# Patient Record
Sex: FEMALE | Race: BLACK OR AFRICAN AMERICAN
[De-identification: names, ages, dates, MRNs, and addresses within clinical notes are randomized per-mention and may not be internally consistent; named-entity substitution may affect disease eponyms.]

---

## 2018-12-28 LAB
HCT VFR BLD CALC: 37.9 % (ref 36–45)
INR BLD: 1.02
LYMPHOCYTES # SPEC AUTO: 1.8 K/UL (ref 0.7–4.9)
PMV BLD: 8.6 FL (ref 7.6–11.3)
RBC # BLD: 4.14 M/UL (ref 3.86–4.86)
UA COMPLETE W REFLEX CULTURE PNL UR: (no result)
UA DIPSTICK W REFLEX MICRO PNL UR: (no result)

## 2019-01-03 ENCOUNTER — HOSPITAL ENCOUNTER (OUTPATIENT)
Dept: HOSPITAL 97 - OR | Age: 49
Discharge: HOME | End: 2019-01-03
Attending: OBSTETRICS & GYNECOLOGY
Payer: COMMERCIAL

## 2019-01-03 DIAGNOSIS — N73.6: ICD-10-CM

## 2019-01-03 DIAGNOSIS — N81.2: Primary | ICD-10-CM

## 2019-01-03 DIAGNOSIS — E03.9: ICD-10-CM

## 2019-01-03 DIAGNOSIS — N92.1: ICD-10-CM

## 2019-01-03 DIAGNOSIS — R87.810: ICD-10-CM

## 2019-01-03 DIAGNOSIS — N70.11: ICD-10-CM

## 2019-01-03 DIAGNOSIS — Z80.0: ICD-10-CM

## 2019-01-03 PROCEDURE — 86850 RBC ANTIBODY SCREEN: CPT

## 2019-01-03 PROCEDURE — 36415 COLL VENOUS BLD VENIPUNCTURE: CPT

## 2019-01-03 PROCEDURE — 81025 URINE PREGNANCY TEST: CPT

## 2019-01-03 PROCEDURE — 81003 URINALYSIS AUTO W/O SCOPE: CPT

## 2019-01-03 PROCEDURE — 81015 MICROSCOPIC EXAM OF URINE: CPT

## 2019-01-03 PROCEDURE — 0USG7ZZ REPOSITION VAGINA, VIA NATURAL OR ARTIFICIAL OPENING: ICD-10-PCS

## 2019-01-03 PROCEDURE — 85730 THROMBOPLASTIN TIME PARTIAL: CPT

## 2019-01-03 PROCEDURE — 85610 PROTHROMBIN TIME: CPT

## 2019-01-03 PROCEDURE — 86901 BLOOD TYPING SEROLOGIC RH(D): CPT

## 2019-01-03 PROCEDURE — 0UT94ZZ RESECTION OF UTERUS, PERCUTANEOUS ENDOSCOPIC APPROACH: ICD-10-PCS

## 2019-01-03 PROCEDURE — 88305 TISSUE EXAM BY PATHOLOGIST: CPT

## 2019-01-03 PROCEDURE — 86900 BLOOD TYPING SEROLOGIC ABO: CPT

## 2019-01-03 PROCEDURE — 0UT54ZZ RESECTION OF RIGHT FALLOPIAN TUBE, PERCUTANEOUS ENDOSCOPIC APPROACH: ICD-10-PCS

## 2019-01-03 PROCEDURE — 85025 COMPLETE CBC W/AUTO DIFF WBC: CPT

## 2019-01-03 RX ADMIN — SODIUM CHLORIDE ONE MLS: 0.9 INJECTION, SOLUTION INTRAVENOUS at 08:16

## 2019-01-03 RX ADMIN — CEFAZOLIN SODIUM ONE MLS: 2 SOLUTION INTRAVENOUS at 08:18

## 2019-01-03 RX ADMIN — CEFAZOLIN SODIUM ONE MLS: 2 SOLUTION INTRAVENOUS at 08:10

## 2019-01-03 RX ADMIN — SODIUM CHLORIDE ONE MLS: 0.9 INJECTION, SOLUTION INTRAVENOUS at 08:30

## 2019-01-07 NOTE — OP
Date of Procedure:  01/03/2019



Surgeon:  Funmi Brooks MD



Assistant:  Emi Cee.



Preoperative Diagnoses:  Uterine prolapse, incomplete uterovaginal prolapse, excessive menstrual blee
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



Postoperative Diagnoses:  Uterine prolapse, incomplete uterovaginal prolapse, excessive menstrual ble
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



Procedures Performed:  

1.Total laparoscopic hysterectomy, bilateral salpingectomy.

2.Uterosacral ligament suspension, colpopexy, and cystoscopy.



Anesthesia:  General.



Specimens:  Uterus, bilateral tubes.



Complications:  No complications.



Drains:  No drains.



Condition:  Stable.



Findings:  Appendix completely normal.  No evidence of any significant endometriosis.  There were adh
esions of the ovary to the left lateral wall, especially on the left.  There were dense thick adhesio
ns.  Bilateral tubes had signs of hydrosalpinges.  Liver and gallbladder are unremarkable. 

Ovaries were unremarkable, so they were left in place.  Uterus and tubes were removed.  The vaginal c
uff was suspended to the uterosacral ligaments with the help of PDS sutures and the vaginal cuff clos
ure was done in 2 layers.  One layer with 0 Vicryl and the second layer with the help of 2-0 V-Loc.



Indications:  The patient is a 48-year-old with heavy bleeding, increasing over time and increasing v
aginal bulge symptoms.  She also had history of HPV 18, however, her Pap smears have been negative.  
A transvaginal ultrasound was performed, sampling was performed.  No adnexal masses were noted and no
 endometrial atypia or malignancy were noted.  Discussed all the options, especially given the fact t
hat she has uterine prolapse.  This is the most significant defect.  Her POP-Q was minus 2, minus 2, 
0, 4, moderate.  Total vaginal length 7-8 cm, minus 2, minus 2, and minus 1.  She needed prolapse rep
air as her main symptom.  She did not want to use pessary and wanted surgical intervention. 



She had known stress urinary incontinence.  No occult stress urinary incontinence.  Negative cough st
ress test at both places and no symptoms of SHARON or incomplete emptying. 



No further urodynamic testing was performed.  She was brought to the OR after being consented for hys
terectomy, bilateral salpingectomy, uterosacral ligament suspension, colpopexy and if needed, perineo
rrhaphy or anterior wall repair.



Description Of Procedure:  After informed consent was verified, the patient was taken back to the OR,
 placed in a supine fashion on the operating table.  After general anesthesia was given, she was plac
ed in a dorsal lithotomy position using Demetrio stirrups.  Arms were tucked by the side.  Catheter was 
placed in the bladder and attached to cysto tubing to an LR bag, emptied 300 and a large VCare was in
troduced into the uterus.  The cervix appeared to be cystic and significantly enlarged.  The large VC
are cup was a bit too small for this.  However, since that is the largest cup on the VCare system bruno
t is what I used and this was fixed in place.  This area was draped. 



A 1 cm infraumbilical incision was made with a scalpel using the open laparoscopy.  A supraumbilical 
incision was made with a scalpel.  Using the open laparoscopy technique, the fascia was incised.  A s
mall area of the umbilical hernia was noted.  There was preperitoneal fat that was protruding through
 this.  This was removed.  The fascia was completely exposed well for later suturing.  Tags with 0 Vi
cryl sutures were placed on each side.  Peritoneum entered bluntly, S retractors placed.  Josue intr
oduced.  Insufflation of the abdomen optimally done.  The patient's upper abdominal survey negative. 
 Liver, gallbladder, upper abdominal surfaces, and omentum were normal.  The patient was placed in T 
bag.  A 5 mm left lower quadrant and 10 mm suprapubic incisions were made.  There was no right lower 
quadrant incision.  All the trocars were placed under direct vision.  Visualized the courses of the u
reters.  The tubes and ovaries as dictated above were unremarkable other than the mild hydrosalpinges
.  There was adhesion of the left ovary to the left lateral wall as well as the right to the posterio
r aspect of the broad ligament.  These adhesions were taken down with sharp dissection and with the L
igaSure 5 mm curved tip.  Then, all the anatomical restorations were done.  Then, the VCare cup was d
ifficult to visualize.  This was anticipated given the fact that the cervical size was larger than th
e VCare cup. 



Then, the broad ligament was opened up below the round ligament, bladder flap raised all the way to t
he opposite round ligament.  Bladder dissected inferiorly after finding the vesicovaginal space with 
a monopolar hook blade.  The dissection of the bladder in the midline was first performed, at least 4
 cm from the area of the colpotomy in the midline.  Then, came down took the round ligament on the le
ft side.  Mesosalpinx tube and utero-ovarian ligament were all taken down posteriorly.  Dissection wa
s performed all the way to the left uterosacral.  Broad ligament, skeletonized vessels exposed on the
 opposite side.  Similar dissection was performed to take the utero-ovarian, mesosalpinx tube, round 
ligament, posterior broad ligament to the right uterosacral and broad ligament, skeletonized to expos
e the vessels.  The medial aspect of the vessels was opened up to create a space with a monopolar les
k blade and bipolar basket tip was used for cauterization and then coagulation with the LigaSure and 
the vessels were cut.  The cardinal ligaments with the supports had to be taken down systematically b
ecause it was difficult to identify the rim of the cup.  So, in this process, most likely some part o
f the cervix was left behind on the right side.  On the left side in a similar fashion, the same vess
els were taken down, the cardinal ligaments were taken down.  The dissection took some time in order 
for me to get through the vagina.  This was difficult to find and anteriorly the colpotomy was starte
d with a monopolar hook blade, brought to the side on the right side.  It was difficult to find the r
im of the VCare cup and cut along it in the fornix, so I went down posteriorly to the level of the ut
erosacral ligament, opened up the vaginal wall and then connected the dots laterally to finish the di
ssection.  On the opposite side, similar dissection was performed, where the posterior colpotomy was 
connected to the anterior and then the specimen removed through the vagina.  The rest of the cervix o
n each side was excised making the cut on the vagina clean one.  There was more cautery than desired,
 so decided to close the vaginal wall in 2 layers. 



A simple thorough irrigation and suction were performed.  The left tube and ovary were taken down.  T
he right tube and ovary were also taken down with the help of the LigaSure after isolating the tubes.
  The tubes were removed.  Infundibulopelvic ligament was preserved and the ovaries were preserved on
 both sides. 



Then, uterosacral suspension was reperformed.  It was difficult to identify the uterosacral remnant, 
so started midway in the pelvis in an anatomical course of the uterosacral ligament.  Medial to the u
reter, the uterosacral was picked up with a grasper.  Then, PDS suture was placed to identify and pic
k it up; however, this was unsatisfactory.  So, I wanted to start the closure of the vaginal cuff.  T
he closure with the simple 0 Vicryl suture on the left and then on the right and then in a continuous
 running suture with locked stitches for a couple as I came towards the left and from the right and t
he tail end was tied to the left tail, and the closure was completed.  At this point, the PDS sutures
 were and the uterosacral was sutured. Two continuous sutures through the posterior vaginal wall were
 picked up and the needle passed through there and then the anterior vaginal wall and the fascia were
 all included in the suture without really entering into the vaginal epithelium.  This was an imbrica
ting stitch at the level of the uterosacral on the left side.  A similar stitch was placed on the rig
ht side as well.  Once these stitches were tied down, there were excellent closure and invagination o
f the ends of the vaginal cuff closure, so a second layer to complete was placed with the help of 2-0
 V-Loc suture and this was placed from the posterior vaginal wall to the anterior vaginal wall in a c
ontinuous running fashion with 3 sutures passed through and the fourth one cut.  There was excellent 
closure of the anterior and posterior walls without leaving a rim for an enterocele at the apex and t
hen the uterosacral suspension appeared to be adequate. 



When I went down and examined vaginally after changing my gloves, there was excellent anterior wall s
upport.  There was no need for any repair down here.  Leung was removed, cystoscopy was performed wit
h a 17-Irish sheath, 30-degree lens and normal saline.  Both ureteric orifices had strong streams of
 urine promptly.  No evidence of any trauma to the bladder.  The Leung was replaced.  I came down to 
examine the vaginal cuff.  There were excellent closure and apposition in the vagina. 



Came back into the abdomen.  Thorough irrigation and suction were performed.  All pedicles were compl
etely hemostatic.  No evidence of electrical, mechanical, or thermal injury to the ureter.  All the t
rocars were removed under direct vision.  Gas was desufflated.  The Josue was removed as well.  The 
area of the umbilical incision was closed with 0 PDS sutures x2 in a buried figure-of-eight fashion o
n both sides, making sure that the umbilical area did not have the room for hernia.  The repair was d
one optimally.  The fascia at the suprapubic incision was closed with the help of 0 Vicryl simple sti
tch.  All the skin incisions were closed with the help of 4-0 Monocryl interrupted.  Leung and the va
ginal occluder bulb were all removed.  The patient was recovered from anesthesia and taken to PACU in
 stable condition.  EBL was minimal.  Instrument, needle, and sponge counts x3 were correct at the en
d of the case before the patient was woken up.





SK/SHARIF

DD:  01/06/2019 21:34:40Voice ID:  [id number]

DT:  01/07/2019 04:48:53Report ID:  [id number]